# Patient Record
Sex: MALE | Race: BLACK OR AFRICAN AMERICAN | Employment: FULL TIME | URBAN - METROPOLITAN AREA
[De-identification: names, ages, dates, MRNs, and addresses within clinical notes are randomized per-mention and may not be internally consistent; named-entity substitution may affect disease eponyms.]

---

## 2022-10-15 ENCOUNTER — APPOINTMENT (OUTPATIENT)
Dept: GENERAL RADIOLOGY | Age: 52
End: 2022-10-15
Payer: COMMERCIAL

## 2022-10-15 ENCOUNTER — HOSPITAL ENCOUNTER (EMERGENCY)
Age: 52
Discharge: HOME | End: 2022-10-15
Payer: COMMERCIAL

## 2022-10-15 VITALS
WEIGHT: 278 LBS | TEMPERATURE: 97.9 F | SYSTOLIC BLOOD PRESSURE: 135 MMHG | DIASTOLIC BLOOD PRESSURE: 90 MMHG | BODY MASS INDEX: 32.17 KG/M2 | RESPIRATION RATE: 16 BRPM | OXYGEN SATURATION: 100 % | HEIGHT: 78 IN | HEART RATE: 69 BPM

## 2022-10-15 DIAGNOSIS — S92.355A CLOSED NONDISPLACED FRACTURE OF FIFTH METATARSAL BONE OF LEFT FOOT, INITIAL ENCOUNTER: Primary | ICD-10-CM

## 2022-10-15 PROCEDURE — 73610 X-RAY EXAM OF ANKLE: CPT

## 2022-10-15 PROCEDURE — 28470 CLTX METATARSAL FX WO MNP EA: CPT

## 2022-10-15 PROCEDURE — 73630 X-RAY EXAM OF FOOT: CPT

## 2022-10-15 PROCEDURE — 99283 EMERGENCY DEPT VISIT LOW MDM: CPT

## 2022-10-15 RX ORDER — DICLOFENAC SODIUM 75 MG/1
75 TABLET, DELAYED RELEASE ORAL 2 TIMES DAILY PRN
Qty: 20 TABLET | Refills: 3 | Status: SHIPPED | OUTPATIENT
Start: 2022-10-15

## 2022-10-15 ASSESSMENT — ENCOUNTER SYMPTOMS
RESPIRATORY NEGATIVE: 1
GASTROINTESTINAL NEGATIVE: 1
BACK PAIN: 0

## 2022-10-15 ASSESSMENT — PAIN DESCRIPTION - ORIENTATION: ORIENTATION: LEFT;INNER;OUTER

## 2022-10-15 ASSESSMENT — PAIN DESCRIPTION - LOCATION: LOCATION: ANKLE

## 2022-10-15 ASSESSMENT — PAIN SCALES - GENERAL: PAINLEVEL_OUTOF10: 10

## 2022-10-15 ASSESSMENT — LIFESTYLE VARIABLES
HOW OFTEN DO YOU HAVE A DRINK CONTAINING ALCOHOL: MONTHLY OR LESS
HOW MANY STANDARD DRINKS CONTAINING ALCOHOL DO YOU HAVE ON A TYPICAL DAY: 1 OR 2

## 2022-10-15 ASSESSMENT — PAIN - FUNCTIONAL ASSESSMENT: PAIN_FUNCTIONAL_ASSESSMENT: 0-10

## 2022-10-15 ASSESSMENT — PAIN DESCRIPTION - FREQUENCY: FREQUENCY: CONTINUOUS

## 2022-10-15 NOTE — ED PROVIDER NOTES
[facility]        Pt Name: Eleuterio Grimaldo  MRN: [id number]  [place] 1970  Date of evaluation: 10/15/2022  Provider: Malik De Los Santos PA-C  PCP: Fish JJ  Note Started: 2:50 PM EDT       BHAVNA. I have evaluated this patient. My supervising physician was available for consultation. CHIEF COMPLAINT       Chief Complaint   Patient presents with    Ankle Pain     Pt states he was playing basketball today and came down on his foot wrong, and heard a pop. Ankle pain 10/10        HISTORY OF PRESENT ILLNESS   (Location, Timing/Onset, Context/Setting, Quality, Duration, Modifying Factors, Severity, Associated Signs and Symptoms)  Note limiting factors. Chief Complaint: Left ankle/foot pain    Eleuterio Grimaldo is a 47 y.o. male who presents complaining of left foot and ankle pain from just prior to arrival.  Patient states he was playing basketball, jumped and landed and when he landed felt a pop and had immediate pain in his left midfoot. Pain is sharp, constant, worse with walking, relieved by rest, and the midfoot dorsally, radiates to the lateral foot and ankle. Denies prior surgery to the area, swelling, wound, weakness, paresthesia, knee pain. Patient ambulatory since. Nursing Notes were all reviewed and agreed with or any disagreements were addressed in the HPI. REVIEW OF SYSTEMS    (2-9 systems for level 4, 10 or more for level 5)     Review of Systems   Constitutional: Negative. Respiratory: Negative. Cardiovascular: Negative. Gastrointestinal: Negative. Musculoskeletal:  Negative for back pain, joint swelling and neck pain. Skin: Negative. Neurological: Negative. Hematological: Negative. Positives and Pertinent negatives as per HPI. Except as noted above in the ROS, all other systems were reviewed and negative. PAST MEDICAL HISTORY   No past medical history on file.       SURGICAL HISTORY     Past Surgical History:   Procedure Laterality Date    KNEE ARTHROSCOPY Left 1989         CURRENTMEDICATIONS       Previous Medications    No medications on file         ALLERGIES     Patient has no known allergies. FAMILYHISTORY     No family history on file. SOCIAL HISTORY          SCREENINGS    [place] Coma Scale  Eye Opening: Spontaneous  Best Verbal Response: Oriented  Best Motor Response: Obeys commands  [place] Coma Scale Score: 15        PHYSICAL EXAM    (up to 7 for level 4, 8 or more for level 5)     ED Triage Vitals [10/15/22 1439]   BP Temp Temp src Heart Rate Resp SpO2 Height Weight   (!) 172/96 97.9 °F (36.6 °C) -- 69 16 100 % 6' 8\" (2.032 m) 278 lb (126.1 kg)       Physical Exam  Vitals and nursing note reviewed. Constitutional:       General: He is not in acute distress. Appearance: Normal appearance. He is not ill-appearing or toxic-appearing. HENT:      Head: Normocephalic and atraumatic. Right Ear: External ear normal.      Left Ear: External ear normal.   Eyes:      Conjunctiva/sclera: Conjunctivae normal.   Cardiovascular:      Rate and Rhythm: Normal rate. Pulses: Normal pulses. Pulmonary:      Effort: Pulmonary effort is normal.   Musculoskeletal:         General: Tenderness (left dorsal mid foot and lateral) present. No swelling or deformity. Normal range of motion. Cervical back: Normal range of motion. Right lower leg: No edema. Left lower leg: No edema. Comments: Achilles intact on exam   Skin:     General: Skin is warm and dry. Capillary Refill: Capillary refill takes less than 2 seconds. Neurological:      General: No focal deficit present. Mental Status: He is alert and oriented to person, place, and time. Sensory: No sensory deficit. Motor: No weakness.    Psychiatric:         Mood and Affect: Mood normal.         Behavior: Behavior normal.       DIAGNOSTIC RESULTS   LABS:    Labs Reviewed - No data to display    When ordered only abnormal lab results are displayed. All other labs were within normal range or not returned as of this dictation. EKG: When ordered, EKG's are interpreted by the Emergency Department Physician in the absence of a cardiologist.  Please see their note for interpretation of EKG. RADIOLOGY:   Non-plain film images such as CT, Ultrasound and MRI are read by the radiologist. Plain radiographic images are visualized and preliminarily interpreted by the ED Provider with the below findings:        Interpretation per the Radiologist below, if available at the time of this note:    XR FOOT LEFT (MIN 3 VIEWS)   Final Result   Nondisplaced fracture proximal 5th metatarsal      Calcification dorsal to the navicular could represent an avulsion fracture      No acute fracture seen in the ankle         XR ANKLE LEFT (MIN 3 VIEWS)   Final Result   Nondisplaced fracture proximal 5th metatarsal      Calcification dorsal to the navicular could represent an avulsion fracture      No acute fracture seen in the ankle           No results found. PROCEDURES   Unless otherwise noted below, none     Procedures    CRITICAL CARE TIME       CONSULTS:  None      EMERGENCY DEPARTMENT COURSE and DIFFERENTIAL DIAGNOSIS/MDM:   Vitals:    Vitals:    10/15/22 1439 10/15/22 1444   BP: (!) 172/96 (!) 135/90   Pulse: 69    Resp: 16    Temp: 97.9 °F (36.6 °C)    SpO2: 100%    Weight: 278 lb (126.1 kg)    Height: 6' 8\" (2.032 m)        Patient was given the following medications:  Medications - No data to display      Is this patient to be included in the SEP-1 Core Measure due to severe sepsis or septic shock? No   Exclusion criteria - the patient is NOT to be included for SEP-1 Core Measure due to: Infection is not suspected    Briefly, patient with left foot pain while playing basketball. Has nondisplaced proximal fifth metatarsal fracture. No open wounds, compartment syndrome, other injuries.   Patient is neurovascular intact. Patient placed in boot, crutches, instructed to stay minimal/no weightbearing until Ortho follow-up. Instructed return for any new or worsening symptoms. FINAL IMPRESSION      1. Closed nondisplaced fracture of fifth metatarsal bone of left foot, initial encounter          DISPOSITION/PLAN   DISPOSITION Decision To Discharge 10/15/2022 03:28:03 PM      PATIENT REFERRED TO:  Silvana Lynch MD  [facility]  [place] [address]  [phone]    In 2 days  Orthopedic follow-up. Return for any new or worsening symptoms peer    DISCHARGE MEDICATIONS:  New Prescriptions    DICLOFENAC (VOLTAREN) 75 MG EC TABLET    Take 1 tablet by mouth 2 times daily as needed for Pain       DISCONTINUED MEDICATIONS:  Discontinued Medications    No medications on file              (Please note that portions of this note were completed with a voice recognition program.  Efforts were made to edit the dictations but occasionally words are mis-transcribed. )    Chelsi Gonsalez PA-C (electronically signed)            Chelsi Gonsalez PA-C  10/15/22 3337

## 2022-10-18 ENCOUNTER — OFFICE VISIT (OUTPATIENT)
Dept: ORTHOPEDIC SURGERY | Age: 52
End: 2022-10-18
Payer: COMMERCIAL

## 2022-10-18 VITALS — WEIGHT: 278 LBS | HEIGHT: 78 IN | BODY MASS INDEX: 32.17 KG/M2

## 2022-10-18 DIAGNOSIS — E55.9 VITAMIN D DEFICIENCY: Primary | ICD-10-CM

## 2022-10-18 DIAGNOSIS — E55.9 VITAMIN D DEFICIENCY: ICD-10-CM

## 2022-10-18 LAB — VITAMIN D 25-HYDROXY: 42.6 NG/ML

## 2022-10-18 PROCEDURE — 99203 OFFICE O/P NEW LOW 30 MIN: CPT | Performed by: ORTHOPAEDIC SURGERY

## 2022-10-24 NOTE — PROGRESS NOTES
10/18/2022     Reason for visit:  Left foot pain following injury on 10/15/2022    History of Present Illness:  Patient is a 55-year-old male who presents for evaluation of his left foot. He injured himself while playing basketball. He was pushing off when he felt immediate left foot pain. Ever since then he has had persistent lateral base pain. He presents to the ED where x-rays demonstrated a fracture of the fifth metatarsal.    Medical History:  No past medical history on file. Past Surgical History:   Procedure Laterality Date    KNEE ARTHROSCOPY Left 1989      No family history on file. Social History     Socioeconomic History    Marital status: Single     Spouse name: Not on file    Number of children: Not on file    Years of education: Not on file    Highest education level: Not on file   Occupational History    Not on file   Tobacco Use    Smoking status: Not on file    Smokeless tobacco: Not on file   Substance and Sexual Activity    Alcohol use: Not on file    Drug use: Not on file    Sexual activity: Not on file   Other Topics Concern    Not on file   Social History Narrative    Not on file     Social Determinants of Health     Financial Resource Strain: Not on file   Food Insecurity: Not on file   Transportation Needs: Not on file   Physical Activity: Not on file   Stress: Not on file   Social Connections: Not on file   Intimate Partner Violence: Not on file   Housing Stability: Not on file      Current Outpatient Medications on File Prior to Visit   Medication Sig Dispense Refill    diclofenac (VOLTAREN) 75 MG EC tablet Take 1 tablet by mouth 2 times daily as needed for Pain 20 tablet 3     No current facility-administered medications on file prior to visit. No Known Allergies     Review of Systems:  Constitutional: Patient is adequately groomed with no evidence of malnutrition  Mental Status: The patient is oriented to time, place and person.   The patient's mood and affect are appropriate. Lymphatic: The lymphatic examination bilaterally reveals all areas to be without enlargement or induration. Vascular: Examination reveals no swelling or calf tenderness. Peripheral pulses are palpable and 2+. Neurological: The patient has good coordination. There is no weakness or sensory deficit. Skin:  Head/Neck: inspection reveals no rashes, ulcerations or lesions. Trunk: inspection reveals no rashes, ulcerations or lesions. Objective:  Ht 6' 8\" (2.032 m)   Wt 278 lb (126.1 kg)   BMI 30.54 kg/m²      Physical Exam:  The patient is well-appearing and in no apparent distress  Examination of the left foot  Swelling laterally with tenderness palpation over the base of the fifth metatarsal  5 out of 5 strength throughout distal muscle groups  Sensation is intact to light touch throughout all distributions  There is no calf swelling or tenderness  Palpable DP pulse, brisk cap refill, 2+ symmetric reflexes     Imaging:  X-rays of the left foot were reviewed. Minimally displaced fracture of the base of the fifth metatarsal is noted      Assessment:  Left fifth metatarsal base fracture sustained on 10/15/2022    Plan:  Discussed with patient the diagnosis and treatment options. I do recommend initial nonoperative management. He will be weight-bear as tolerated in a cam walker boot. We will check his vitamin D level. Return in 4 weeks. Repeat x-rays at that time. Greater than 30 minutes were spent with this encounter. Time spent included evaluating the patient's chart prior to arrival.  Evaluating the patient in the office including history, physical examination, imaging reviewing, and counseling on next steps. Lastly, time was spent discussing orders with my staff as well as providing documentation in the chart.                     Enzo Resendiz MD            Orthopaedic Surgery Sports Medicine and [address] [place] [address] Team Physician [facility] ([place]      Disclaimer: This note was dictated with voice recognition software. Though review and correction are routine, we apologize for any errors.

## 2022-11-15 ENCOUNTER — OFFICE VISIT (OUTPATIENT)
Dept: ORTHOPEDIC SURGERY | Age: 52
End: 2022-11-15
Payer: COMMERCIAL

## 2022-11-15 VITALS — HEIGHT: 78 IN | WEIGHT: 278 LBS | BODY MASS INDEX: 32.17 KG/M2

## 2022-11-15 DIAGNOSIS — M79.672 LEFT FOOT PAIN: Primary | ICD-10-CM

## 2022-11-15 PROCEDURE — 99213 OFFICE O/P EST LOW 20 MIN: CPT | Performed by: ORTHOPAEDIC SURGERY

## 2022-11-22 NOTE — PROGRESS NOTES
11/15/2022     Reason for visit:  Left foot pain following injury on 10/15/2022    History of Present Illness:  Patient is a 50-year-old male who presents for evaluation of his left foot. He injured himself while playing basketball. He was pushing off when he felt immediate left foot pain. Ever since then he has had persistent lateral base pain. He presents to the ED where x-rays demonstrated a fracture of the fifth metatarsal.    He has been using a cam walker boot. He does reports less pain but is still having some discomfort. Objective:  Ht 6' 8\" (2.032 m)   Wt 278 lb (126.1 kg)   BMI 30.54 kg/m²      Physical Exam:  The patient is well-appearing and in no apparent distress  Examination of the left foot  Swelling laterally with tenderness palpation over the base of the fifth metatarsal  5 out of 5 strength throughout distal muscle groups  Sensation is intact to light touch throughout all distributions  There is no calf swelling or tenderness  Palpable DP pulse, brisk cap refill, 2+ symmetric reflexes     Imaging:  View x-rays of the left foot were obtained in office today on 11/15/2022 and reviewed. Minimally displaced fracture of the base of the fifth metatarsal is noted with some interval healing      Assessment:  Left fifth metatarsal base fracture sustained on 10/15/2022    Plan:  Discussed with patient the diagnosis and treatment options. I do recommend initial nonoperative management. He will be weight-bear as tolerated in a cam walker boot. Return to see me in 2 weeks repeat x-rays at that time. Greater than 20 minutes were spent with this encounter. Time spent included evaluating the patient's chart prior to arrival.  Evaluating the patient in the office including history, physical examination, imaging reviewing, and counseling on next steps. Lastly, time was spent discussing orders with my staff as well as providing documentation in the chart.                     Reese Arroyo MD

## 2022-11-29 ENCOUNTER — OFFICE VISIT (OUTPATIENT)
Dept: ORTHOPEDIC SURGERY | Age: 52
End: 2022-11-29
Payer: COMMERCIAL

## 2022-11-29 VITALS — BODY MASS INDEX: 32.17 KG/M2 | WEIGHT: 278 LBS | HEIGHT: 78 IN

## 2022-11-29 DIAGNOSIS — M79.672 LEFT FOOT PAIN: Primary | ICD-10-CM

## 2022-11-29 PROCEDURE — 99213 OFFICE O/P EST LOW 20 MIN: CPT | Performed by: ORTHOPAEDIC SURGERY

## 2022-12-06 NOTE — PROGRESS NOTES
11/29/2022     Reason for visit:  Left foot pain following injury on 10/15/2022    History of Present Illness:  Patient is a 73-year-old male who presents for evaluation of his left foot. He injured himself while playing basketball. He was pushing off when he felt immediate left foot pain. Ever since then he has had persistent lateral base pain. He presents to the ED where x-rays demonstrated a fracture of the fifth metatarsal.    He has been using a cam walker boot. Reports that he currently does not have pain. Objective:  Ht 6' 8\" (2.032 m)   Wt 278 lb (126.1 kg)   BMI 30.54 kg/m²      Physical Exam:  The patient is well-appearing and in no apparent distress  Examination of the left foot  Swelling laterally with tenderness palpation over the base of the fifth metatarsal  5 out of 5 strength throughout distal muscle groups  Sensation is intact to light touch throughout all distributions  There is no calf swelling or tenderness  Palpable DP pulse, brisk cap refill, 2+ symmetric reflexes     Imaging:  View x-rays of the left foot were obtained in office today on 11/15/2022 and reviewed. Minimally displaced fracture of the base of the fifth metatarsal is noted with some interval healing      Assessment:  Left fifth metatarsal base fracture sustained on 10/15/2022    Plan:  Discussed with patient the diagnosis and treatment options. Reassuring that he has no pain at this point. At this point he will increase his activity level. If he does increase his activity level and his pain increases or returns then he will call us and neck step would be a CT scan. Alternatively he would then return to see me in 4 weeks if he is not having pain we would repeat x-ray. Greater than 20 minutes were spent with this encounter.   Time spent included evaluating the patient's chart prior to arrival.  Evaluating the patient in the office including history, physical examination, imaging reviewing, and counseling on next steps. Lastly, time was spent discussing orders with my staff as well as providing documentation in the chart. Smooth Lara MD            Orthopaedic Surgery Sports Medicine and [address] and [address] [facility]            Team Physician [facility] ([place])      Disclaimer: This note was dictated with voice recognition software. Though review and correction are routine, we apologize for any errors.

## 2022-12-27 ENCOUNTER — OFFICE VISIT (OUTPATIENT)
Dept: ORTHOPEDIC SURGERY | Age: 52
End: 2022-12-27
Payer: COMMERCIAL

## 2022-12-27 VITALS — HEIGHT: 78 IN | BODY MASS INDEX: 32.17 KG/M2 | WEIGHT: 278 LBS

## 2022-12-27 DIAGNOSIS — M79.672 LEFT FOOT PAIN: Primary | ICD-10-CM

## 2022-12-27 PROCEDURE — 99213 OFFICE O/P EST LOW 20 MIN: CPT | Performed by: ORTHOPAEDIC SURGERY

## 2022-12-27 NOTE — PROGRESS NOTES
12/27/2022     Reason for visit:  Left foot pain following injury on 10/15/2022    History of Present Illness:  Patient is a 52-year-old male who presents for evaluation of his left foot. He injured himself while playing basketball. He was pushing off when he felt immediate left foot pain. Ever since then he has had persistent lateral base pain. He presents to the ED where x-rays demonstrated a fracture of the fifth metatarsal.  We have treated him nonoperatively. He was wearing a cam walker boot for a period time. 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 He reports minimal to no pain. Objective:  Ht 6' 8\" (2.032 m)   Wt 278 lb (126.1 kg)   BMI 30.54 kg/m²      Physical Exam:  The patient is well-appearing and in no apparent distress  Examination of the left foot  Swelling laterally with tenderness palpation over the base of the fifth metatarsal  5 out of 5 strength throughout distal muscle groups  Sensation is intact to light touch throughout all distributions  There is no calf swelling or tenderness  Palpable DP pulse, brisk cap refill, 2+ symmetric reflexes     Imaging:  View x-rays of the left foot were obtained in office today on 12/27/2022 and reviewed. Minimally displaced fracture of the base of the fifth metatarsal is noted with some interval healing      Assessment:  Left fifth metatarsal base fracture sustained on 10/15/2022    Plan:  Discussed with patient the diagnosis and treatment options. Reassuring that he has no pain at this point. Continue activity as tolerated. Return to see me in 6 weeks for repeat evaluation and x-rays. Greater than 20 minutes were spent with this encounter. Time spent included evaluating the patient's chart prior to arrival.  Evaluating the patient in the office including history, physical examination, imaging reviewing, and counseling on next steps.   Lastly, time was spent discussing orders with my staff as well as providing documentation in the chart. Shoaib Chopra MD            Orthopaedic Surgery Sports Medicine and [address] and [address] [facility]            Team Physician [facility] ([place])      Disclaimer: This note was dictated with voice recognition software. Though review and correction are routine, we apologize for any errors.

## 2023-02-07 ENCOUNTER — OFFICE VISIT (OUTPATIENT)
Dept: ORTHOPEDIC SURGERY | Age: 53
End: 2023-02-07
Payer: COMMERCIAL

## 2023-02-07 VITALS — WEIGHT: 278 LBS | HEIGHT: 78 IN | BODY MASS INDEX: 32.17 KG/M2

## 2023-02-07 DIAGNOSIS — M79.672 LEFT FOOT PAIN: Primary | ICD-10-CM

## 2023-02-07 PROCEDURE — 99213 OFFICE O/P EST LOW 20 MIN: CPT | Performed by: ORTHOPAEDIC SURGERY

## 2023-02-13 NOTE — PROGRESS NOTES
2/7/2023     Reason for visit:  Left foot pain following injury on 10/15/2022    History of Present Illness:  Patient is a 60-year-old male who presents for evaluation of his left foot. He injured himself while playing basketball. He was pushing off when he felt immediate left foot pain. Ever since then he has had persistent lateral base pain. He presents to the ED where x-rays demonstrated a fracture of the fifth metatarsal.  We have treated him nonoperatively. He was wearing a cam walker boot for a period time. 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 He does report that overall he has less pain than he did previously however he continues to have some occasional discomfort within the foot and the fracture region. Objective:  Ht 6' 8\" (2.032 m)   Wt 278 lb (126.1 kg)   BMI 30.54 kg/m²      Physical Exam:  The patient is well-appearing and in no apparent distress  Examination of the left foot  Swelling laterally with tenderness palpation over the base of the fifth metatarsal  5 out of 5 strength throughout distal muscle groups  Sensation is intact to light touch throughout all distributions  There is no calf swelling or tenderness  Palpable DP pulse, brisk cap refill, 2+ symmetric reflexes     Imaging:  View x-rays of the left foot were obtained in office today on 2/7/2022 and reviewed. Minimally displaced fracture of the base of the fifth metatarsal is noted with some interval healing but definitely has incomplete healing      Assessment:  Left fifth metatarsal base fracture sustained on 10/15/2022    Plan:  Discussed with patient the diagnosis and treatment options. Unfortunately he has incomplete healing on x-rays and still has some discomfort. As result of this I would recommend further evaluation with a CT scan.   Following his CT I would have him follow-up with my partner to discuss the operative and nonoperative management of this condition. Greater than 20 minutes were spent with this encounter. Time spent included evaluating the patient's chart prior to arrival.  Evaluating the patient in the office including history, physical examination, imaging reviewing, and counseling on next steps. Lastly, time was spent discussing orders with my staff as well as providing documentation in the chart. Nataly Gonzáles MD            Orthopaedic Surgery Sports Medicine and [address] and [address] [facility]            Team Physician [facility] ([place])      Disclaimer: This note was dictated with voice recognition software. Though review and correction are routine, we apologize for any errors.

## 2023-02-14 ENCOUNTER — TELEPHONE (OUTPATIENT)
Dept: ORTHOPEDIC SURGERY | Age: 53
End: 2023-02-14

## 2023-02-14 ENCOUNTER — HOSPITAL ENCOUNTER (OUTPATIENT)
Dept: CT IMAGING | Age: 53
Discharge: HOME | End: 2023-02-14
Payer: COMMERCIAL

## 2023-02-14 DIAGNOSIS — M79.672 LEFT FOOT PAIN: ICD-10-CM

## 2023-02-14 DIAGNOSIS — M79.672 LEFT FOOT PAIN: Primary | ICD-10-CM

## 2023-02-14 PROCEDURE — 73700 CT LOWER EXTREMITY W/O DYE: CPT
